# Patient Record
Sex: FEMALE | Race: WHITE | NOT HISPANIC OR LATINO | URBAN - METROPOLITAN AREA
[De-identification: names, ages, dates, MRNs, and addresses within clinical notes are randomized per-mention and may not be internally consistent; named-entity substitution may affect disease eponyms.]

---

## 2024-08-07 ENCOUNTER — MEDICAL CORRESPONDENCE (OUTPATIENT)
Dept: HEALTH INFORMATION MANAGEMENT | Facility: CLINIC | Age: 39
End: 2024-08-07

## 2024-08-08 ENCOUNTER — TRANSCRIBE ORDERS (OUTPATIENT)
Dept: OTHER | Age: 39
End: 2024-08-08

## 2024-08-08 DIAGNOSIS — L74.9 SWEATING ABNORMALITY: ICD-10-CM

## 2024-08-08 DIAGNOSIS — E23.0 ACTH DEFICIENCY (H): Primary | ICD-10-CM

## 2024-09-18 ENCOUNTER — MEDICAL CORRESPONDENCE (OUTPATIENT)
Dept: HEALTH INFORMATION MANAGEMENT | Facility: CLINIC | Age: 39
End: 2024-09-18
Payer: COMMERCIAL

## 2024-09-19 ENCOUNTER — TRANSCRIBE ORDERS (OUTPATIENT)
Dept: OTHER | Age: 39
End: 2024-09-19

## 2024-09-19 DIAGNOSIS — L74.9 SWEATING ABNORMALITY: ICD-10-CM

## 2024-09-19 DIAGNOSIS — E23.0 ACTH DEFICIENCY (H): Primary | ICD-10-CM

## 2024-10-07 ENCOUNTER — TELEPHONE (OUTPATIENT)
Dept: ENDOCRINOLOGY | Facility: CLINIC | Age: 39
End: 2024-10-07
Payer: COMMERCIAL

## 2024-10-07 NOTE — TELEPHONE ENCOUNTER
Left Voicemail (1st Attempt) for the patient to call back and schedule the following:    Appointment type: New Endocrine   Provider: see below   Return date: Sooner than appt on 4/30   Specialty phone number: [phone]  Additional appointment(s) needed:   Additonal Notes: LVM, No MyC x1   Ingris Alexis MD  [facility]  To schedulers : please offer to move forward on schedule with either  Dave Whalen Ruanpeng, Horacio Reza, Julia Garcia Kristan, Bantle, Moheet, Chow, Seaquist using NON-CALL week open new/LIEN (60 minutes) or 2 back to back 30 minute LIEN spaces.    Examples:  10/10 Radha in person csc  12/3 Ahmed virtual csc  12/27 Ahmed in person csc  12/30 Ahmed in person csc  12/31 Alameddine in person mg   12/31 Ahmed virtual csc    Please note that the above appointment(s) will require manual scheduling as they are marked as LIEN and will not appear using auto search. Do not schedule the patient if another patient has already been scheduled in the requested appointment slot.     Madison Loco on 10/7/2024 at 11:19 AM

## 2024-10-09 NOTE — TELEPHONE ENCOUNTER
Left Voicemail (2nd Attempt) for the patient to call back and schedule the following:    Appointment type: New Endocrine   Provider: see below   Return date: Sooner than appt on 4/30   Specialty phone number: [phone]  Additional appointment(s) needed:   Additonal Notes: LVM x2, No MyC, letter sent x1   Ingris Alexis MD  [facility]  To schedulers : please offer to move forward on schedule with either  Dr Villanueva, Dave, Nu, Juaquin, Horacio, Radha,  Celine Dumont Bantle, Moheet, Chow, Stephanie using NON-CALL week open new/LIEN (60 minutes) or 2 back to back 30 minute LIEN spaces.     Examples:  10/10 Ahmed in person csc (in 2 back to back fast pass slots if still avail)   12/27 Ahmed in person csc  12/30 Ahmed in person csc  12/31 Alameddine in person mg   12/31 Ahmed virtual csc  1/3 Ahmed in person csc    Please note that the above appointment(s) will require manual scheduling as they are marked as LIEN and will not appear using auto search. Do not schedule the patient if another patient has already been scheduled in the requested appointment slot.     Madison Loco on 10/9/2024 at 8:49 AM

## 2024-12-04 NOTE — CONFIDENTIAL NOTE
RECORDS RECEIVED FROM: internal    DATE RECEIVED:  12.27.24    NOTES (FOR ALL VISITS) STATUS DETAILS   OFFICE NOTES from referring provider [facility]-   William Fatima PA-C      OFFICE NOTES from other specialist [facility]-   9.23.24 Irene   7.24.24 Kushal      MEDICATION LIST internal     IMAGING      MRI (BRAIN) Ce  - 4.27.23    XR (Chest) Ce  [facility]- 2.14.24   CT (HEAD/NECK/CHEST/ABDOMEN) [facility]- 2.14.24    LABS     DIABETES: HBGA1C, CREATININE, FASTING LIPIDS, MICROALBUMIN URINE, POTASSIUM, TSH, T4    THYROID: TSH, T4, CBC, THYRODLONULIN, TOTAL T3, FREE T4, CALCITONIN, CEA internal /ce          Action 12.4.24 sv    Action Taken Image request sent to   ---   CXR- 2.14.24  CT-2.14.24    ---   MRI- 4.27.23

## 2024-12-26 NOTE — PROGRESS NOTES
12/26/24 10:34 AM : Appointment reminder phone call made to patient.No Answer. LVM Advising pt to arrive 15 mins early

## 2024-12-27 ENCOUNTER — PRE VISIT (OUTPATIENT)
Dept: ENDOCRINOLOGY | Facility: CLINIC | Age: 39
End: 2024-12-27

## 2024-12-27 ENCOUNTER — OFFICE VISIT (OUTPATIENT)
Dept: ENDOCRINOLOGY | Facility: CLINIC | Age: 39
End: 2024-12-27
Payer: COMMERCIAL

## 2024-12-27 ENCOUNTER — LAB (OUTPATIENT)
Dept: LAB | Facility: CLINIC | Age: 39
End: 2024-12-27
Payer: COMMERCIAL

## 2024-12-27 VITALS
WEIGHT: 102 LBS | HEART RATE: 106 BPM | HEIGHT: 63 IN | SYSTOLIC BLOOD PRESSURE: 129 MMHG | DIASTOLIC BLOOD PRESSURE: 87 MMHG | OXYGEN SATURATION: 100 % | BODY MASS INDEX: 18.07 KG/M2

## 2024-12-27 DIAGNOSIS — L74.9 SWEATING ABNORMALITY: ICD-10-CM

## 2024-12-27 DIAGNOSIS — E23.0 ACTH DEFICIENCY (H): ICD-10-CM

## 2024-12-27 DIAGNOSIS — E27.49 SECONDARY ADRENAL INSUFFICIENCY (H): Primary | ICD-10-CM

## 2024-12-27 LAB
T3 SERPL-MCNC: 133 NG/DL (ref 85–202)
T4 FREE SERPL-MCNC: 1.15 NG/DL (ref 0.9–1.7)
TSH SERPL DL<=0.005 MIU/L-ACNC: 4.06 UIU/ML (ref 0.3–4.2)

## 2024-12-27 PROCEDURE — 83520 IMMUNOASSAY QUANT NOS NONAB: CPT | Performed by: STUDENT IN AN ORGANIZED HEALTH CARE EDUCATION/TRAINING PROGRAM

## 2024-12-27 PROCEDURE — 84480 ASSAY TRIIODOTHYRONINE (T3): CPT | Performed by: STUDENT IN AN ORGANIZED HEALTH CARE EDUCATION/TRAINING PROGRAM

## 2024-12-27 PROCEDURE — 99417 PROLNG OP E/M EACH 15 MIN: CPT | Performed by: STUDENT IN AN ORGANIZED HEALTH CARE EDUCATION/TRAINING PROGRAM

## 2024-12-27 PROCEDURE — 99205 OFFICE O/P NEW HI 60 MIN: CPT | Performed by: STUDENT IN AN ORGANIZED HEALTH CARE EDUCATION/TRAINING PROGRAM

## 2024-12-27 PROCEDURE — G2211 COMPLEX E/M VISIT ADD ON: HCPCS | Performed by: STUDENT IN AN ORGANIZED HEALTH CARE EDUCATION/TRAINING PROGRAM

## 2024-12-27 PROCEDURE — 84443 ASSAY THYROID STIM HORMONE: CPT | Performed by: PATHOLOGY

## 2024-12-27 PROCEDURE — 84586 ASSAY OF VIP: CPT | Mod: 90 | Performed by: PATHOLOGY

## 2024-12-27 PROCEDURE — 36415 COLL VENOUS BLD VENIPUNCTURE: CPT | Performed by: PATHOLOGY

## 2024-12-27 PROCEDURE — 84439 ASSAY OF FREE THYROXINE: CPT | Performed by: PATHOLOGY

## 2024-12-27 PROCEDURE — 99000 SPECIMEN HANDLING OFFICE-LAB: CPT | Performed by: PATHOLOGY

## 2024-12-27 RX ORDER — ZAVEGEPANT 10 MG/.1ML
10 SPRAY NASAL
COMMUNITY
Start: 2024-12-16

## 2024-12-27 RX ORDER — FLUDROCORTISONE ACETATE 0.1 MG/1
0.1 TABLET ORAL DAILY
COMMUNITY

## 2024-12-27 RX ORDER — DEXTROMETHORPHAN HYDROBROMIDE, BUPROPION HYDROCHLORIDE 105; 45 MG/1; MG/1
1 TABLET, MULTILAYER, EXTENDED RELEASE ORAL 2 TIMES DAILY
COMMUNITY

## 2024-12-27 RX ORDER — DICYCLOMINE HCL 20 MG
40 TABLET ORAL EVERY 6 HOURS
COMMUNITY

## 2024-12-27 RX ORDER — HYDROCORTISONE 5 MG/1
TABLET ORAL
Qty: 360 TABLET | Refills: 2 | Status: SHIPPED | OUTPATIENT
Start: 2024-12-27

## 2024-12-27 RX ORDER — ONDANSETRON 8 MG/1
TABLET, FILM COATED ORAL EVERY 8 HOURS PRN
COMMUNITY

## 2024-12-27 RX ORDER — OMEPRAZOLE 40 MG/1
40 CAPSULE, DELAYED RELEASE ORAL DAILY
COMMUNITY

## 2024-12-27 RX ORDER — ACYCLOVIR 400 MG/1
400 TABLET ORAL DAILY
COMMUNITY

## 2024-12-27 RX ORDER — LORAZEPAM 1 MG/1
1 TABLET ORAL EVERY 6 HOURS PRN
COMMUNITY

## 2024-12-27 RX ORDER — HYDROCORTISONE SODIUM SUCCINATE 100 MG/2ML
100 INJECTION INTRAMUSCULAR; INTRAVENOUS
Qty: 2 ML | Refills: 3 | Status: SHIPPED | OUTPATIENT
Start: 2024-12-27

## 2024-12-27 RX ORDER — SYRINGE W-NEEDLE,DISPOSAB,3 ML 23GX1"
100 SYRINGE, EMPTY DISPOSABLE MISCELLANEOUS PRN
Qty: 3 EACH | Refills: 3 | Status: SHIPPED | OUTPATIENT
Start: 2024-12-27

## 2024-12-27 RX ORDER — HYDROCORTISONE 5 MG/1
15 TABLET ORAL DAILY
COMMUNITY
Start: 2024-07-24 | End: 2024-12-27

## 2024-12-27 RX ORDER — CYCLOBENZAPRINE HCL 10 MG
10 TABLET ORAL 3 TIMES DAILY PRN
COMMUNITY

## 2024-12-27 RX ORDER — ATOGEPANT 60 MG/1
1 TABLET ORAL DAILY
COMMUNITY

## 2024-12-27 ASSESSMENT — PAIN SCALES - GENERAL: PAINLEVEL_OUTOF10: SEVERE PAIN (6)

## 2024-12-27 NOTE — PATIENT INSTRUCTIONS
"- To switch to hydrocortisone 10 mg 08;00 am and then 2.5 mg at 12:00 pm then 2.5 mg at 5:00 pm   - Regarding Fludrocortisone please discuss with the cardiologist if they need to continue it for POTS .   - To get the blood test done   - To collect urine over 24 hours   Brief description of what you need to do:  Do the test on a day off, so you can stay home.  Wake up and flush the first urine.  Then collect all the urine for that day, evening and overnight if you wake to urinate. Plus the first urine of the next morning.    For women,  the lab should also give you a \"hat\" which is a device to help collect the urine and to pour it into the collection jug.   The urine must be kept cool, not frozen.  So, do not put it out on the porch.  Keep it in the fridge.     - Follow up in 6 months     "
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
## 2024-12-31 LAB — TRYPTASE SERPL-MCNC: 3.7 UG/L

## 2025-01-06 LAB — VIP SERPL-MCNC: 93.7 PG/ML

## 2025-02-09 ENCOUNTER — HEALTH MAINTENANCE LETTER (OUTPATIENT)
Age: 40
End: 2025-02-09

## 2025-02-27 ENCOUNTER — TELEPHONE (OUTPATIENT)
Dept: ENDOCRINOLOGY | Facility: CLINIC | Age: 40
End: 2025-02-27
Payer: COMMERCIAL

## 2025-04-08 ENCOUNTER — VIRTUAL VISIT (OUTPATIENT)
Dept: ENDOCRINOLOGY | Facility: CLINIC | Age: 40
End: 2025-04-08
Payer: COMMERCIAL

## 2025-04-08 DIAGNOSIS — L74.9 SWEATING ABNORMALITY: Primary | ICD-10-CM

## 2025-04-08 DIAGNOSIS — G90.A POTS (POSTURAL ORTHOSTATIC TACHYCARDIA SYNDROME): ICD-10-CM

## 2025-04-08 DIAGNOSIS — E27.49 SECONDARY ADRENAL INSUFFICIENCY: ICD-10-CM

## 2025-04-08 PROCEDURE — 98007 SYNCH AUDIO-VIDEO EST HI 40: CPT | Performed by: STUDENT IN AN ORGANIZED HEALTH CARE EDUCATION/TRAINING PROGRAM

## 2025-04-08 PROCEDURE — G2211 COMPLEX E/M VISIT ADD ON: HCPCS | Performed by: STUDENT IN AN ORGANIZED HEALTH CARE EDUCATION/TRAINING PROGRAM

## 2025-04-08 PROCEDURE — 99417 PROLNG OP E/M EACH 15 MIN: CPT | Performed by: STUDENT IN AN ORGANIZED HEALTH CARE EDUCATION/TRAINING PROGRAM

## 2025-04-08 RX ORDER — FLUDROCORTISONE ACETATE 0.1 MG/1
TABLET ORAL
Qty: 90 TABLET | Refills: 3 | Status: SHIPPED | OUTPATIENT
Start: 2025-04-08

## 2025-04-08 RX ORDER — HYDROCORTISONE 5 MG/1
TABLET ORAL
Qty: 360 TABLET | Refills: 3 | Status: SHIPPED | OUTPATIENT
Start: 2025-04-08

## 2025-04-08 ASSESSMENT — PATIENT HEALTH QUESTIONNAIRE - PHQ9
10. IF YOU CHECKED OFF ANY PROBLEMS, HOW DIFFICULT HAVE THESE PROBLEMS MADE IT FOR YOU TO DO YOUR WORK, TAKE CARE OF THINGS AT HOME, OR GET ALONG WITH OTHER PEOPLE: EXTREMELY DIFFICULT
SUM OF ALL RESPONSES TO PHQ QUESTIONS 1-9: 22
SUM OF ALL RESPONSES TO PHQ QUESTIONS 1-9: 22

## 2025-04-08 NOTE — NURSING NOTE
Current patient location: [address] [place] [id number]    Is the patient currently in the state of [place]? YES    Visit mode: VIDEO    If the visit is dropped, the patient can be reconnected by:VIDEO VISIT: Text to cell phone:   Telephone Information:   Mobile [phone]       Will anyone else be joining the visit? NO  (If patient encounters technical issues they should call [phone]    Are changes needed to the allergy or medication list? No    Are refills needed on medications prescribed by this physician? YES - maybe.    Rooming Documentation:  Not applicable    Reason for visit: AMA JUAREZF

## 2025-04-08 NOTE — PATIENT INSTRUCTIONS
"- to change hydrocortisone to 10 mg at 8:00 am 2.5 mg at 02:00 pm then 2.5 mg at 7 :00 pm   - to increase the florinef dose to half tablet in the morning and one tablet in the evening   -To get the labs done in the morning and to get the urine test done       Brief description of what you need to do:  Do the test on a day off, so you can stay home.  Wake up and flush the first urine.  Then collect all the urine for that day, evening and overnight if you wake to urinate. Plus the first urine of the next morning.    For women,  the lab should also give you a \"hat\" which is a device to help collect the urine and to pour it into the collection jug.   The urine must be kept cool, not frozen.  So, do not put it out on the porch.  Keep it in the fridge.       - To monitor your blood pressure .    "
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
